# Patient Record
Sex: FEMALE | Race: WHITE | Employment: FULL TIME | URBAN - METROPOLITAN AREA
[De-identification: names, ages, dates, MRNs, and addresses within clinical notes are randomized per-mention and may not be internally consistent; named-entity substitution may affect disease eponyms.]

---

## 2024-01-27 ENCOUNTER — OFFICE VISIT (OUTPATIENT)
Dept: URGENT CARE | Facility: URGENT CARE | Age: 29
End: 2024-01-27
Payer: COMMERCIAL

## 2024-01-27 VITALS
DIASTOLIC BLOOD PRESSURE: 79 MMHG | HEART RATE: 131 BPM | SYSTOLIC BLOOD PRESSURE: 117 MMHG | RESPIRATION RATE: 14 BRPM | OXYGEN SATURATION: 97 % | TEMPERATURE: 99.3 F

## 2024-01-27 DIAGNOSIS — J01.00 ACUTE NON-RECURRENT MAXILLARY SINUSITIS: Primary | ICD-10-CM

## 2024-01-27 PROCEDURE — 99203 OFFICE O/P NEW LOW 30 MIN: CPT | Performed by: FAMILY MEDICINE

## 2024-01-27 RX ORDER — DOXYCYCLINE 100 MG/1
100 CAPSULE ORAL 2 TIMES DAILY
Qty: 20 CAPSULE | Refills: 0 | Status: SHIPPED | OUTPATIENT
Start: 2024-01-27 | End: 2024-02-06

## 2024-01-27 RX ORDER — FLUTICASONE PROPIONATE 50 MCG
1-2 SPRAY, SUSPENSION (ML) NASAL DAILY
Qty: 16 G | Refills: 0 | Status: SHIPPED | OUTPATIENT
Start: 2024-01-27

## 2024-03-09 ENCOUNTER — HEALTH MAINTENANCE LETTER (OUTPATIENT)
Age: 29
End: 2024-03-09

## 2024-05-10 NOTE — PROGRESS NOTES
ASSESSMENT/  PLAN:  Acute non-recurrent maxillary sinusitis     - doxycycline hyclate (VIBRAMYCIN) 100 MG capsule; Take 1 capsule (100 mg) by mouth 2 times daily for 10 days  - fluticasone (FLONASE) 50 MCG/ACT nasal spray; Spray 1-2 sprays into both nostrils daily     We discussed the primary importance of home cares to promote drainage and ventilation of the sinuses to decrease symptoms of sinus pressure and to eliminate infectious drainage from the sinuses.  I encouraged the use of saline nasal spray as needed to promote cleaning of the nasal passages and to promote drainage of the sinuses.   steroid nasal spray help reduce swelling within the nasal tissue and may help open drainage/ ventilation passages to the sinuses.    Antibiotics are discussed as a secondary therapy for sinus infections that are unresponsive to home measures to promote sinus drainage and ventilation.     Follow up with primary clinic if not improving  --------------------------------------------------------------------------------------------------------------    SUBJECTIVE:  Chief Complaint   Patient presents with    Nasal Congestion     Facial congestion, drainage in throat, fever started on Wed, thinks she has a sinus infection     Paulina Flannery is a 28 year old female here with concerns about sinus infection.  She states onset of symptoms were 10 day(s) ago, though she has had nasal stuffiness 2 months.  She has had maxillary, frontal pressure. Course of illness is worsening. Severity moderate  Current and Associated symptoms: fever, nasal congestion, facial pain/pressure, and headache  Predisposing factors include recent illness. Recent treatment has included: Steroid nasal spray    No past medical history on file.  There is no problem list on file for this patient.        ALLERGIES:  Amoxicillin, Amoxicillin-pot clavulanate, and Cefixime    No current outpatient medications on file prior to visit.  No current    Spirometry done   facility-administered medications on file prior to visit.      Social History     Tobacco Use    Smoking status: Not on file    Smokeless tobacco: Not on file   Substance Use Topics    Alcohol use: Not on file       No family history on file.    ROS:  CONSTITUTIONAL:low grade fever, chills,    INTEGUMENTARY/SKIN: NEGATIVE for worrisome rashes,   or lesions  EYES: NEGATIVE for vision changes or irritation  RESP:NEGATIVE for significant cough or SOB    OBJECTIVE:  /79   Pulse (!) 131   Temp 99.3  F (37.4  C)   Resp 14   SpO2 97%   GENERAL APPEARANCE: alert, mild distress, and cooperative  EYES: EOMI,  PERRL, conjunctiva clear  HENT: ear canals and TM's normal.  Nose normal.  Pharynx erythematous with some exudate noted.  HENT: frontal sinus tenderness  and maxillary sinus tenderness   NECK: supple, non-tender to palpation, no adenopathy noted  RESP: lungs clear to auscultation - no rales, rhonchi or wheezes  CV: regular rates and rhythm, normal S1 S2, no murmur noted  SKIN: no suspicious lesions or rashes

## 2025-03-16 ENCOUNTER — HEALTH MAINTENANCE LETTER (OUTPATIENT)
Age: 30
End: 2025-03-16